# Patient Record
Sex: FEMALE | Race: WHITE | Employment: FULL TIME | URBAN - METROPOLITAN AREA
[De-identification: names, ages, dates, MRNs, and addresses within clinical notes are randomized per-mention and may not be internally consistent; named-entity substitution may affect disease eponyms.]

---

## 2018-05-12 ENCOUNTER — APPOINTMENT (OUTPATIENT)
Dept: MRI IMAGING | Age: 48
End: 2018-05-12
Attending: EMERGENCY MEDICINE
Payer: COMMERCIAL

## 2018-05-12 ENCOUNTER — APPOINTMENT (OUTPATIENT)
Dept: CT IMAGING | Age: 48
End: 2018-05-12
Attending: EMERGENCY MEDICINE
Payer: COMMERCIAL

## 2018-05-12 ENCOUNTER — HOSPITAL ENCOUNTER (OUTPATIENT)
Age: 48
Setting detail: OBSERVATION
Discharge: HOME | End: 2018-05-14
Attending: EMERGENCY MEDICINE | Admitting: INTERNAL MEDICINE
Payer: COMMERCIAL

## 2018-05-12 DIAGNOSIS — R42 VERTIGO: ICD-10-CM

## 2018-05-12 DIAGNOSIS — R11.2 NAUSEA AND VOMITING, INTRACTABILITY OF VOMITING NOT SPECIFIED, UNSPECIFIED VOMITING TYPE: ICD-10-CM

## 2018-05-12 DIAGNOSIS — H55.00 NYSTAGMUS: Primary | ICD-10-CM

## 2018-05-12 LAB
ALBUMIN SERPL-MCNC: 3.7 G/DL (ref 3.5–5)
ALBUMIN/GLOB SERPL: 1.3 {RATIO} (ref 1.1–2.2)
ALP SERPL-CCNC: 77 U/L (ref 45–117)
ALT SERPL-CCNC: 27 U/L (ref 12–78)
ANION GAP SERPL CALC-SCNC: 14 MMOL/L (ref 5–15)
AST SERPL-CCNC: 21 U/L (ref 15–37)
BASOPHILS # BLD: 0.1 K/UL (ref 0–0.1)
BASOPHILS NFR BLD: 1 % (ref 0–1)
BILIRUB SERPL-MCNC: 0.5 MG/DL (ref 0.2–1)
BUN SERPL-MCNC: 16 MG/DL (ref 6–20)
BUN/CREAT SERPL: 16 (ref 12–20)
CALCIUM SERPL-MCNC: 8.5 MG/DL (ref 8.5–10.1)
CHLORIDE SERPL-SCNC: 110 MMOL/L (ref 97–108)
CO2 SERPL-SCNC: 18 MMOL/L (ref 21–32)
CREAT SERPL-MCNC: 1.01 MG/DL (ref 0.55–1.02)
DIFFERENTIAL METHOD BLD: ABNORMAL
EOSINOPHIL # BLD: 0.1 K/UL (ref 0–0.4)
EOSINOPHIL NFR BLD: 1 % (ref 0–7)
ERYTHROCYTE [DISTWIDTH] IN BLOOD BY AUTOMATED COUNT: 13.2 % (ref 11.5–14.5)
GLOBULIN SER CALC-MCNC: 2.9 G/DL (ref 2–4)
GLUCOSE SERPL-MCNC: 136 MG/DL (ref 65–100)
HCG SERPL QL: NEGATIVE
HCT VFR BLD AUTO: 37.8 % (ref 35–47)
HGB BLD-MCNC: 12.7 G/DL (ref 11.5–16)
IMM GRANULOCYTES # BLD: 0.1 K/UL (ref 0–0.04)
IMM GRANULOCYTES NFR BLD AUTO: 1 % (ref 0–0.5)
LIPASE SERPL-CCNC: 102 U/L (ref 73–393)
LYMPHOCYTES # BLD: 2.4 K/UL (ref 0.8–3.5)
LYMPHOCYTES NFR BLD: 20 % (ref 12–49)
MCH RBC QN AUTO: 28.9 PG (ref 26–34)
MCHC RBC AUTO-ENTMCNC: 33.6 G/DL (ref 30–36.5)
MCV RBC AUTO: 86.1 FL (ref 80–99)
MONOCYTES # BLD: 0.6 K/UL (ref 0–1)
MONOCYTES NFR BLD: 5 % (ref 5–13)
NEUTS SEG # BLD: 8.5 K/UL (ref 1.8–8)
NEUTS SEG NFR BLD: 72 % (ref 32–75)
NRBC # BLD: 0 K/UL (ref 0–0.01)
NRBC BLD-RTO: 0 PER 100 WBC
PLATELET # BLD AUTO: 241 K/UL (ref 150–400)
PMV BLD AUTO: 12.1 FL (ref 8.9–12.9)
POTASSIUM SERPL-SCNC: 3.2 MMOL/L (ref 3.5–5.1)
PROT SERPL-MCNC: 6.6 G/DL (ref 6.4–8.2)
RBC # BLD AUTO: 4.39 M/UL (ref 3.8–5.2)
SODIUM SERPL-SCNC: 142 MMOL/L (ref 136–145)
WBC # BLD AUTO: 11.8 K/UL (ref 3.6–11)

## 2018-05-12 PROCEDURE — 83690 ASSAY OF LIPASE: CPT | Performed by: EMERGENCY MEDICINE

## 2018-05-12 PROCEDURE — 85025 COMPLETE CBC W/AUTO DIFF WBC: CPT | Performed by: EMERGENCY MEDICINE

## 2018-05-12 PROCEDURE — 93005 ELECTROCARDIOGRAM TRACING: CPT

## 2018-05-12 PROCEDURE — 96375 TX/PRO/DX INJ NEW DRUG ADDON: CPT

## 2018-05-12 PROCEDURE — A9575 INJ GADOTERATE MEGLUMI 0.1ML: HCPCS | Performed by: EMERGENCY MEDICINE

## 2018-05-12 PROCEDURE — 99285 EMERGENCY DEPT VISIT HI MDM: CPT

## 2018-05-12 PROCEDURE — 74011250636 HC RX REV CODE- 250/636

## 2018-05-12 PROCEDURE — 84703 CHORIONIC GONADOTROPIN ASSAY: CPT | Performed by: EMERGENCY MEDICINE

## 2018-05-12 PROCEDURE — 96361 HYDRATE IV INFUSION ADD-ON: CPT

## 2018-05-12 PROCEDURE — 74011250636 HC RX REV CODE- 250/636: Performed by: EMERGENCY MEDICINE

## 2018-05-12 PROCEDURE — 65660000000 HC RM CCU STEPDOWN

## 2018-05-12 PROCEDURE — 80053 COMPREHEN METABOLIC PANEL: CPT | Performed by: EMERGENCY MEDICINE

## 2018-05-12 PROCEDURE — 99218 HC RM OBSERVATION: CPT

## 2018-05-12 PROCEDURE — 74011250636 HC RX REV CODE- 250/636: Performed by: INTERNAL MEDICINE

## 2018-05-12 PROCEDURE — 96376 TX/PRO/DX INJ SAME DRUG ADON: CPT

## 2018-05-12 PROCEDURE — 70553 MRI BRAIN STEM W/O & W/DYE: CPT

## 2018-05-12 PROCEDURE — 96372 THER/PROPH/DIAG INJ SC/IM: CPT

## 2018-05-12 PROCEDURE — 36415 COLL VENOUS BLD VENIPUNCTURE: CPT | Performed by: EMERGENCY MEDICINE

## 2018-05-12 PROCEDURE — 70450 CT HEAD/BRAIN W/O DYE: CPT

## 2018-05-12 PROCEDURE — 96374 THER/PROPH/DIAG INJ IV PUSH: CPT

## 2018-05-12 RX ORDER — ENOXAPARIN SODIUM 100 MG/ML
40 INJECTION SUBCUTANEOUS EVERY 24 HOURS
Status: DISCONTINUED | OUTPATIENT
Start: 2018-05-12 | End: 2018-05-14 | Stop reason: HOSPADM

## 2018-05-12 RX ORDER — SODIUM CHLORIDE 0.9 % (FLUSH) 0.9 %
10 SYRINGE (ML) INJECTION
Status: ACTIVE | OUTPATIENT
Start: 2018-05-12 | End: 2018-05-13

## 2018-05-12 RX ORDER — MECLIZINE HCL 12.5 MG 12.5 MG/1
25 TABLET ORAL EVERY 6 HOURS
Status: DISCONTINUED | OUTPATIENT
Start: 2018-05-12 | End: 2018-05-14 | Stop reason: HOSPADM

## 2018-05-12 RX ORDER — SODIUM CHLORIDE 9 MG/ML
75 INJECTION, SOLUTION INTRAVENOUS CONTINUOUS
Status: DISCONTINUED | OUTPATIENT
Start: 2018-05-12 | End: 2018-05-13

## 2018-05-12 RX ORDER — CETIRIZINE HCL 10 MG
10 TABLET ORAL DAILY
Status: DISCONTINUED | OUTPATIENT
Start: 2018-05-13 | End: 2018-05-14 | Stop reason: HOSPADM

## 2018-05-12 RX ORDER — THERA TABS 400 MCG
1 TAB ORAL DAILY
COMMUNITY

## 2018-05-12 RX ORDER — THERA TABS 400 MCG
1 TAB ORAL DAILY
Status: DISCONTINUED | OUTPATIENT
Start: 2018-05-13 | End: 2018-05-14 | Stop reason: HOSPADM

## 2018-05-12 RX ORDER — ONDANSETRON 2 MG/ML
INJECTION INTRAMUSCULAR; INTRAVENOUS
Status: COMPLETED
Start: 2018-05-12 | End: 2018-05-12

## 2018-05-12 RX ORDER — ONDANSETRON 2 MG/ML
4 INJECTION INTRAMUSCULAR; INTRAVENOUS
Status: DISCONTINUED | OUTPATIENT
Start: 2018-05-12 | End: 2018-05-14 | Stop reason: HOSPADM

## 2018-05-12 RX ORDER — LANOLIN ALCOHOL/MO/W.PET/CERES
500 CREAM (GRAM) TOPICAL DAILY
Status: DISCONTINUED | OUTPATIENT
Start: 2018-05-13 | End: 2018-05-14 | Stop reason: HOSPADM

## 2018-05-12 RX ORDER — SODIUM CHLORIDE 0.9 % (FLUSH) 0.9 %
5-10 SYRINGE (ML) INJECTION AS NEEDED
Status: DISCONTINUED | OUTPATIENT
Start: 2018-05-12 | End: 2018-05-14 | Stop reason: HOSPADM

## 2018-05-12 RX ORDER — DEXTROAMPHETAMINE SACCHARATE, AMPHETAMINE ASPARTATE, DEXTROAMPHETAMINE SULFATE AND AMPHETAMINE SULFATE 2.5; 2.5; 2.5; 2.5 MG/1; MG/1; MG/1; MG/1
20 TABLET ORAL
Status: DISCONTINUED | OUTPATIENT
Start: 2018-05-12 | End: 2018-05-14 | Stop reason: HOSPADM

## 2018-05-12 RX ORDER — METOCLOPRAMIDE HYDROCHLORIDE 5 MG/ML
10 INJECTION INTRAMUSCULAR; INTRAVENOUS
Status: COMPLETED | OUTPATIENT
Start: 2018-05-12 | End: 2018-05-12

## 2018-05-12 RX ORDER — SODIUM CHLORIDE 0.9 % (FLUSH) 0.9 %
5-10 SYRINGE (ML) INJECTION EVERY 8 HOURS
Status: DISCONTINUED | OUTPATIENT
Start: 2018-05-12 | End: 2018-05-14 | Stop reason: HOSPADM

## 2018-05-12 RX ORDER — METOCLOPRAMIDE HYDROCHLORIDE 5 MG/ML
10 INJECTION INTRAMUSCULAR; INTRAVENOUS
Status: DISCONTINUED | OUTPATIENT
Start: 2018-05-12 | End: 2018-05-12 | Stop reason: SDUPTHER

## 2018-05-12 RX ORDER — CETIRIZINE HCL 10 MG
TABLET ORAL
COMMUNITY

## 2018-05-12 RX ORDER — ONDANSETRON 2 MG/ML
4 INJECTION INTRAMUSCULAR; INTRAVENOUS
Status: COMPLETED | OUTPATIENT
Start: 2018-05-12 | End: 2018-05-12

## 2018-05-12 RX ORDER — GADOTERATE MEGLUMINE 376.9 MG/ML
17 INJECTION INTRAVENOUS
Status: COMPLETED | OUTPATIENT
Start: 2018-05-12 | End: 2018-05-12

## 2018-05-12 RX ADMIN — SODIUM CHLORIDE 1000 ML: 900 INJECTION, SOLUTION INTRAVENOUS at 15:57

## 2018-05-12 RX ADMIN — METOCLOPRAMIDE 10 MG: 5 INJECTION, SOLUTION INTRAMUSCULAR; INTRAVENOUS at 17:32

## 2018-05-12 RX ADMIN — ONDANSETRON 4 MG: 2 INJECTION INTRAMUSCULAR; INTRAVENOUS at 15:46

## 2018-05-12 RX ADMIN — SODIUM CHLORIDE 75 ML/HR: 900 INJECTION, SOLUTION INTRAVENOUS at 21:54

## 2018-05-12 RX ADMIN — ENOXAPARIN SODIUM 40 MG: 40 INJECTION SUBCUTANEOUS at 22:02

## 2018-05-12 RX ADMIN — MECLIZINE 25 MG: 12.5 TABLET ORAL at 22:03

## 2018-05-12 RX ADMIN — Medication 10 ML: at 21:55

## 2018-05-12 RX ADMIN — GADOTERATE MEGLUMINE 17 ML: 376.9 INJECTION INTRAVENOUS at 20:08

## 2018-05-12 RX ADMIN — ONDANSETRON 4 MG: 2 INJECTION INTRAMUSCULAR; INTRAVENOUS at 22:15

## 2018-05-12 NOTE — PROGRESS NOTES
Admission Medication Reconciliation:    Information obtained from: Patient, RX Query    Significant PMH/Disease States:   Past Medical History:   Diagnosis Date    Asthma     Hypersomnia     TAKES ADDERAL FOR BOUTS OF SUDDEN EXTREME EXHAUSTION    Migraine     Unspecified adverse effect of anesthesia     HYPERSOMNIA    UTI (urinary tract infection) 9/23/13    MULTIPLE IN PAST       Chief Complaint for this Admission:  Vomiting    Allergies:  Latex; Sulfa (sulfonamide antibiotics); Codeine; Other medication; Shellfish derived; and Zantac [ranitidine hcl]    Prior to Admission Medications:   Prior to Admission Medications   Prescriptions Last Dose Informant Patient Reported? Taking? CYANOCOBALAMIN, VITAMIN B-12, (VITAMIN B-12 PO) 5/12/2018 at Unknown time  Yes Yes   Sig: Take  by mouth daily. cetirizine (ZYRTEC) 10 mg tablet 5/12/2018 at Unknown time  Yes Yes   Sig: Take  by mouth daily as needed for Allergies. dextroamphetamine-amphetamine (ADDERALL) 10 mg tablet 5/12/2018 at Unknown time  Yes Yes   Sig: Take 20 mg by mouth daily as neededIndications: IDIOPATHIC HYPERSOMNOLENCE.           ondansetron (ZOFRAN ODT) 4 mg disintegrating tablet 5/12/2018 at Unknown time  No Yes   Sig: Take 1 Tab by mouth every eight (8) hours as needed for Nausea. therapeutic multivitamin (THERAGRAN) tablet 5/12/2018 at Unknown time  Yes Yes   Sig: Take 1 Tab by mouth daily. Facility-Administered Medications: None         Comments/Recommendations: Patient provided information, RX Query as well. Allergies were reviewed. Please note:  1. Heart rate variable from 40s-70s, appeared sinus on monitor, alerted RN immediately (especially given patient presenting symptoms). Providers are aware. Has received Zofran, Reglan and fluid bolus per RN. 2. Generic Adderall: takes for idiopathic hypersomnolence, is currently taking 20 mg (confirmed with her). Added:  1. Cetirizine  2. MVT    Deleted:  1.  CAM (she has stopped using these)    Thank you for allowing me to participate in the care of your patient.     Denae Ward PharmD, RN #[id number]

## 2018-05-12 NOTE — IP AVS SNAPSHOT
Summary of Care Report The Summary of Care report has been created to help improve care coordination. Users with access to [employer] or [address] (Web-based application) may access additional patient information including the Discharge Summary. If you are not currently a [address] user and need more information, please call the number listed below in the [place] [address] section and ask to be connected with Medical Records. Facility Information Name Address Phone [address] [facility] [phone] [phone] Patient Information Patient Name Sex TONY Burden ([id number]) Female 1970 Discharge Information Admitting Provider Service Area Unit Fawn Lyons MD / [place] [address] [place] [address] / [phone] Discharge Provider Discharge Date/Time Discharge Disposition Destination (none) 2018 Morning (Pending) AHR (none) Patient Language Language ENGLISH [13] Hospital Problems as of 2018  Reviewed: 2018  8:53 PM by Fawn Lyons MD  
  
  
  
 Class Noted - Resolved Last Modified POA Active Problems * (Principal)Vertigo  2018 - Present 2018 by Obed Vasques MD Unknown Entered by Fawn Lyons MD  
  Nystagmus  2018 - Present 2018 by Fawn Lyons MD Unknown Entered by Fawn Lyons MD  
  
Non-Hospital Problems as of 2018  Reviewed: 2018  8:53 PM by Fawn Lyons MD  
  
  
  
 Class Noted - Resolved Last Modified Active Problems Abnormal vaginal bleeding  2013 - Present 2013 Entered by Milton Gomez Female pelvic pain (Chronic)  2013 - Present 2013 Entered by Milton Gomez Submucous uterine fibroid  2013 - Present 2013 Entered by Milton Gomez Congenital abnormal shape of uterus  9/27/2013 - Present 9/27/2013 Entered by Waldemar Call You are allergic to the following Allergen Reactions Latex Hives HAS REACTED TO BANDAIDS, GLOVES, CONDOMS Sulfa (Sulfonamide Antibiotics) Rash \"AN ALLERGIC REACTION WITH MY BLOOD; HAD I NOT BEEN ON AN ANTIHISTAMINE I'D BE DEAD, MY DOCTOR SAID\". Codeine Other (comments) FELT LIKE HEAD WAS \"FULL OF COTTON\" Other Medication Hives ENVIRONMENTAL ALLERGIES: DUST-HIVES Shellfish Derived Nausea and Vomiting SCALLOPS ONLY CAUSE N&V, UNCONTROLLABLY Zantac (Ranitidine Hcl) Hives Current Discharge Medication List  
  
START taking these medications Dose & Instructions Dispensing Information Comments  
 meclizine 25 mg tablet Commonly known as:  ANTIVERT Dose:  25 mg Take 1 Tab by mouth three (3) times daily as needed for up to 10 days. Quantity:  20 Tab Refills:  0 CONTINUE these medications which have NOT CHANGED Dose & Instructions Dispensing Information Comments ADDERALL 10 mg tablet Generic drug:  dextroamphetamine-amphetamine Dose:  20 mg Take 20 mg by mouth daily as neededIndications: IDIOPATHIC HYPERSOMNOLENCE. Refills:  0  
   
 cetirizine 10 mg tablet Commonly known as:  ZYRTEC Take  by mouth daily as needed for Allergies. Refills:  0  
   
 ondansetron 4 mg disintegrating tablet Commonly known as:  ZOFRAN ODT Dose:  4 mg Take 1 Tab by mouth every eight (8) hours as needed for Nausea. Quantity:  20 Tab Refills:  1 therapeutic multivitamin tablet Commonly known as:  [facility] Dose:  1 Tab Take 1 Tab by mouth daily. Refills:  0  
   
 VITAMIN B-12 PO Take  by mouth daily. Refills:  0 Follow-up Information Follow up With Details Comments Contact Info See your ENT doctor as sson as you can after discharge Mina Wetzel NP Go on 5/16/2018 Hospital PCP f/u appointment on Wednesday 5/16 @ 11:00 a.m. [address] [place] [address] 
[phone] Discharge Instructions   
  
   
Benign Paroxysmal Positional Vertigo (BPPV): Care Instructions Your Care Instructions Benign paroxysmal positional vertigo, also called BPPV, is an inner ear problem. It causes a spinning or whirling sensation when you move your head. This sensation is called vertigo. The vertigo usually lasts for less than a minute. People often have vertigo spells for a few days or weeks. Then the vertigo goes away. But it may come back again. The vertigo may be mild, or it may be bad enough to cause unsteadiness, nausea, and vomiting. When you move, your inner ear sends messages to the brain. This helps you keep your balance. Vertigo can happen when debris builds up in the inner ear. The buildup can cause the inner ear to send the wrong message to the brain. Your doctor may move you in different positions to help your vertigo get better faster. This is called the Epley maneuver. Your doctor may also prescribe medicines or exercises to help with your symptoms. Follow-up care is a key part of your treatment and safety. Be sure to make and go to all appointments, and call your doctor if you are having problems. It's also a good idea to know your test results and keep a list of the medicines you take. How can you care for yourself at home? · If your doctor suggests that you do Stover-Daroff exercises: ¨ Sit on the edge of a bed or sofa. Quickly lie down on the side that causes the worst vertigo. Lie on your side with your ear down. ¨ Stay in this position for at least 30 seconds or until the vertigo goes away. ¨ Sit up. If this causes vertigo, wait for it to stop. ¨ Repeat the procedure on the other side. ¨ Repeat this 10 times. Do these exercises 2 times a day until the vertigo is gone. When should you call for help? Call 911 anytime you think you may need emergency care. For example, call if: 
? · You have symptoms of a stroke. These may include: 
¨ Sudden numbness, tingling, weakness, or loss of movement in your face, arm, or leg, especially on only one side of your body. ¨ Sudden vision changes. ¨ Sudden trouble speaking. ¨ Sudden confusion or trouble understanding simple statements. ¨ Sudden problems with walking or balance. ¨ A sudden, severe headache that is different from past headaches. ?Call your doctor now or seek immediate medical care if: 
? · You have new or worse nausea and vomiting. ? · You have new symptoms such as hearing loss or roaring in your ears. ? Watch closely for changes in your health, and be sure to contact your doctor if: 
? · You are not getting better as expected. ? · Your vertigo gets worse. Where can you learn more? Go to http://urvashi-kristi.info/. Enter  in the search box to learn more about \"Benign Paroxysmal Positional Vertigo (BPPV): Care Instructions. \" Current as of: May 12, 2017 Content Version: 11.4 © 2538-5604 [employer]. Care instructions adapted under license by [employer] (which disclaims liability or warranty for this information). If you have questions about a medical condition or this instruction, always ask your healthcare professional. [place] [address] any warranty or liability for your use of this information. 
  
 
 Discharge Instructions PATIENT ID: Belia Ramos MRN: [id number] YOB: 1970 DATE OF ADMISSION: 5/12/2018  3:39 PM   
DATE OF DISCHARGE: 5/14/2018 PRIMARY CARE PROVIDER: Adam Diaz MD  
 
ATTENDING PHYSICIAN: Marlyn Brown MD 
DISCHARGING PROVIDER: Marlyn Brown MD   
To contact this individual call [phone] and ask the  to page. If unavailable ask to be transferred the Adult Hospitalist Department. DISCHARGE DIAGNOSES and ADDITIONAL CARE RECOMMENDATIONS: 
Vertigo(Benign Paroxysmal Positional Vertigo (BPPV)) with right nystagmus. Head cat scan and brain MRI are both negative for any acute intracranial processes. You may use the meclizine as needed. You check with your ENT for possible office based treatment . You can also do some home based maneuvers yourself,read the directions you are provided with this discharge paper. CONSULTATIONS: IP CONSULT TO NEUROLOGY PROCEDURES/SURGERIES: * No surgery found * PENDING TEST RESULTS At the time of discharge the following test results are still pending: none FOLLOW UP APPOINTMENTS:  
Follow-up Information Follow up With Details Comments Contact Info See your ENT doctor as sson as you can after discharge DIET: Regular Diet ACTIVITY: Activity as tolerated DISCHARGE MEDICATIONS: 
 See Medication Reconciliation Form · It is important that you take the medication exactly as they are prescribed. · Keep your medication in the bottles provided by the pharmacist and keep a list of the medication names, dosages, and times to be taken in your wallet. · Do not take other medications without consulting your doctor. NOTIFY YOUR PHYSICIAN FOR ANY OF THE FOLLOWING:  
Fever over 101 degrees for 24 hours. Chest pain, shortness of breath, fever, chills, nausea, vomiting, diarrhea, change in mentation, falling, weakness, bleeding. Severe pain or pain not relieved by medications. Or, any other signs or symptoms that you may have questions about. DISPOSITION: 
x  Home With: 
 OT  PT  [place]  RN  
  
 SNF/Inpatient Rehab/[facility] Independent/assisted living Hospice Other:  
 
 
Signed: Bethany Solares MD 
5/14/2018 8:15 AM 
 
 
Chart Review Routing History Recipient Method Report Sent By Solomon Castro MD  
Fax: [phone] Phone: [phone] Fax Krystyna Chavis MD NOTES AUTO ROUTING REPORT [address] [place] [[id number]] 5/14/2018  8:29 AM 05/14/2018

## 2018-05-12 NOTE — IP AVS SNAPSHOT
2700 [facility] [address] [place] [address] 
[phone] Patient: Angel Brown MRN: [id number] :1970 About your hospitalization You were admitted on:  May 12, 2018 You last received care in the:  [id number] Nay[facility] Callahan You were discharged on:  May 14, 2018 Why you were hospitalized Your primary diagnosis was:  Vertigo Your diagnoses also included:  Nystagmus Follow-up Information Follow up With Details Comments Contact Info See your ENT doctor as sson as you can after discharge Alex Em NP Go on 2018 Hospital PCP f/u appointment on  @ 11:00 a.m. 5500 [facility] 
350 [facility] 
[phone] Discharge Orders None A check rocio indicates which time of day the medication should be taken. My Medications START taking these medications Instructions Each Dose to Equal  
 Morning Noon Evening Bedtime  
 meclizine 25 mg tablet Commonly known as:  ANTIVERT Your last dose was: Your next dose is: Take 1 Tab by mouth three (3) times daily as needed for up to 10 days. 25 mg CONTINUE taking these medications Instructions Each Dose to Equal  
 Morning Noon Evening Bedtime ADDERALL 10 mg tablet Generic drug:  dextroamphetamine-amphetamine Your last dose was: Your next dose is: Take 20 mg by mouth daily as neededIndications: IDIOPATHIC HYPERSOMNOLENCE.  
 20 mg  
    
   
   
   
  
 cetirizine 10 mg tablet Commonly known as:  ZYRTEC Your last dose was: Your next dose is: Take  by mouth daily as needed for Allergies. ondansetron 4 mg disintegrating tablet Commonly known as:  ZOFRAN ODT Your last dose was: Your next dose is: Take 1 Tab by mouth every eight (8) hours as needed for Nausea. 4 mg  
    
   
   
   
  
 therapeutic multivitamin tablet Commonly known as:  [facility] Your last dose was: Your next dose is: Take 1 Tab by mouth daily. 1 Tab VITAMIN B-12 PO Your last dose was: Your next dose is: Take  by mouth daily. Where to Get Your Medications Information on where to get these meds will be given to you by the nurse or doctor. ! Ask your nurse or doctor about these medications  
  meclizine 25 mg tablet Discharge Instructions   
  
   
Benign Paroxysmal Positional Vertigo (BPPV): Care Instructions Your Care Instructions Benign paroxysmal positional vertigo, also called BPPV, is an inner ear problem. It causes a spinning or whirling sensation when you move your head. This sensation is called vertigo. The vertigo usually lasts for less than a minute. People often have vertigo spells for a few days or weeks. Then the vertigo goes away. But it may come back again. The vertigo may be mild, or it may be bad enough to cause unsteadiness, nausea, and vomiting. When you move, your inner ear sends messages to the brain. This helps you keep your balance. Vertigo can happen when debris builds up in the inner ear. The buildup can cause the inner ear to send the wrong message to the brain. Your doctor may move you in different positions to help your vertigo get better faster. This is called the Epley maneuver. Your doctor may also prescribe medicines or exercises to help with your symptoms. Follow-up care is a key part of your treatment and safety. Be sure to make and go to all appointments, and call your doctor if you are having problems. It's also a good idea to know your test results and keep a list of the medicines you take. How can you care for yourself at home? · If your doctor suggests that you do Stover-Daroff exercises: ¨ Sit on the edge of a bed or sofa. Quickly lie down on the side that causes the worst vertigo. Lie on your side with your ear down. ¨ Stay in this position for at least 30 seconds or until the vertigo goes away. ¨ Sit up. If this causes vertigo, wait for it to stop. ¨ Repeat the procedure on the other side. ¨ Repeat this 10 times. Do these exercises 2 times a day until the vertigo is gone. When should you call for help? Call 911 anytime you think you may need emergency care. For example, call if: 
? · You have symptoms of a stroke. These may include: 
¨ Sudden numbness, tingling, weakness, or loss of movement in your face, arm, or leg, especially on only one side of your body. ¨ Sudden vision changes. ¨ Sudden trouble speaking. ¨ Sudden confusion or trouble understanding simple statements. ¨ Sudden problems with walking or balance. ¨ A sudden, severe headache that is different from past headaches. ?Call your doctor now or seek immediate medical care if: 
? · You have new or worse nausea and vomiting. ? · You have new symptoms such as hearing loss or roaring in your ears. ? Watch closely for changes in your health, and be sure to contact your doctor if: 
? · You are not getting better as expected. ? · Your vertigo gets worse. Where can you learn more? Go to http://urvashi-kristi.info/. Enter  in the search box to learn more about \"Benign Paroxysmal Positional Vertigo (BPPV): Care Instructions. \" Current as of: May 12, 2017 Content Version: 11.4 © 5100-0734 [employer]. Care instructions adapted under license by [employer] (which disclaims liability or warranty for this information). If you have questions about a medical condition or this instruction, always ask your healthcare professional. [place] [address] any warranty or liability for your use of this information. 
  
 
 Discharge Instructions PATIENT ID: Jackqulyn Riedel MRN: [id number] YOB: 1970 DATE OF ADMISSION: 5/12/2018  3:39 PM   
DATE OF DISCHARGE: 5/14/2018 PRIMARY CARE PROVIDER: Balbir Penaloza MD  
 
ATTENDING PHYSICIAN: Koby Goff MD 
DISCHARGING PROVIDER: Koby Goff MD   
To contact this individual call [phone] and ask the  to page. If unavailable ask to be transferred the Adult Hospitalist Department. DISCHARGE DIAGNOSES and ADDITIONAL CARE RECOMMENDATIONS: 
Vertigo(Benign Paroxysmal Positional Vertigo (BPPV)) with right nystagmus. Head cat scan and brain MRI are both negative for any acute intracranial processes. You may use the meclizine as needed. You check with your ENT for possible office based treatment . You can also do some home based maneuvers yourself,read the directions you are provided with this discharge paper. CONSULTATIONS: IP CONSULT TO NEUROLOGY PROCEDURES/SURGERIES: * No surgery found * PENDING TEST RESULTS At the time of discharge the following test results are still pending: none FOLLOW UP APPOINTMENTS:  
Follow-up Information Follow up With Details Comments Contact Info See your ENT doctor as sson as you can after discharge DIET: Regular Diet ACTIVITY: Activity as tolerated DISCHARGE MEDICATIONS: 
 See Medication Reconciliation Form · It is important that you take the medication exactly as they are prescribed. · Keep your medication in the bottles provided by the pharmacist and keep a list of the medication names, dosages, and times to be taken in your wallet. · Do not take other medications without consulting your doctor. NOTIFY YOUR PHYSICIAN FOR ANY OF THE FOLLOWING:  
Fever over 101 degrees for 24 hours. Chest pain, shortness of breath, fever, chills, nausea, vomiting, diarrhea, change in mentation, falling, weakness, bleeding. Severe pain or pain not relieved by medications. Or, any other signs or symptoms that you may have questions about. DISPOSITION: 
x  Home With: 
 OT  PT  [facility]  RN  
  
 SNF/Inpatient Rehab/LTAC Independent/assisted living Hospice Other:  
 
 
Signed: Cyndi Bautista MD 
5/14/2018 8:15 AM 
 
 
  
  
  
Cotendo Announcement We are excited to announce that we are making your provider's discharge notes available to you in Cotendo. You will see these notes when they are completed and signed by the physician that discharged you from your recent hospital stay. If you have any questions or concerns about any information you see in Cotendo, please call the Health Information Department where you were seen or reach out to your Primary Care Provider for more information about your plan of care. Introducing \[facility]\"" & HEALTH SERVICES! [facility] introduces Cotendo patient portal. Now you can access parts of your medical record, email your doctor's office, and request medication refills online. 1. In your internet browser, go to [email or web address] 2. Click on the First Time User? Click Here link in the Sign In box. You will see the New Member Sign Up page. 3. Enter your Cotendo Access Code exactly as it appears below. You will not need to use this code after youve completed the sign-up process. If you do not sign up before the expiration date, you must request a new code. · Cotendo Access Code: [email or web address] Expires: 8/10/2018  3:42 PM 
 
4. Enter the last four digits of your Social Security Number (xxxx) and Date of Birth (mm/dd/yyyy) as indicated and click Submit. You will be taken to the next sign-up page. 5. Create a Cotendo ID. This will be your Cotendo login ID and cannot be changed, so think of one that is secure and easy to remember. 6. Create a Cotendo password. You can change your password at any time. 7. Enter your Password Reset Question and Answer.  This can be used at a later time if you forget your password. 8. Enter your e-mail address. You will receive e-mail notification when new information is available in [address] 9. Click Sign Up. You can now view and download portions of your medical record. 10. Click the Download Summary menu link to download a portable copy of your medical information. If you have questions, please visit the Frequently Asked Questions section of the [employer] website. Remember, Locaweb is NOT to be used for urgent needs. For medical emergencies, dial 911. Now available from your iPhone and Android! Introducing Esau Rodriguez As a [employer] patient, I wanted to make you aware of our electronic visit tool called Esau Rodriguez. [employer] 24/7 allows you to connect within minutes with a medical provider 24 hours a day, seven days a week via a mobile device or tablet or logging into a secure website from your computer. You can access Esau Rodriguez from anywhere in the [place]. A virtual visit might be right for you when you have a simple condition and feel like you just dont want to get out of bed, or cant get away from work for an appointment, when your regular [employer] provider is not available (evenings, weekends or holidays), or when youre out of town and need minor care. Electronic visits cost only $49 and if the [employer] 24/7 provider determines a prescription is needed to treat your condition, one can be electronically transmitted to a nearby pharmacy*. Please take a moment to enroll today if you have not already done so. The enrollment process is free and takes just a few minutes. To enroll, please download the [employer] 24/7 juan to your tablet or phone, or visit [email or web address] to enroll on your computer.    
And, as an [address] [place] [address] patient with a [employer] account, the results of your visits will be scanned into your electronic medical record and your primary care provider will be able to view the scanned results. We urge you to continue to see your regular [facility] provider for your ongoing medical care. And while your primary care provider may not be the one available when you seek a Esau Hernandezfin virtual visit, the peace of mind you get from getting a real diagnosis real time can be priceless. For more information on [facility], view our Frequently Asked Questions (FAQs) at [email or web address]. Sincerely, 
 
Samantha Razo MD 
Chief Medical Officer [facility] *:  certain medications cannot be prescribed via [facility] Unresulted Labs-Please follow up with your PCP about these lab tests Order Current Status EKG, 12 LEAD, INITIAL Preliminary result Providers Seen During Your Hospitalization Provider Specialty Primary office phone April Chapman MD Emergency Medicine [phone] Opal Garza MD Internal Medicine [phone] Estephania Oliveros MD Internal Medicine [phone] Your Primary Care Physician (PCP) Primary Care Physician Office Phone Office Fax Carolyn Paez [phone] You are allergic to the following Allergen Reactions Latex Hives HAS REACTED TO BANDAIDS, GLOVES, CONDOMS Sulfa (Sulfonamide Antibiotics) Rash \"AN ALLERGIC REACTION WITH MY BLOOD; HAD I NOT BEEN ON AN ANTIHISTAMINE I'D BE DEAD, MY DOCTOR SAID\". Codeine Other (comments) FELT LIKE HEAD WAS \"FULL OF COTTON\" Other Medication Hives ENVIRONMENTAL ALLERGIES: DUST-HIVES Shellfish Derived Nausea and Vomiting SCALLOPS ONLY CAUSE N&V, UNCONTROLLABLY Zantac (Ranitidine Hcl) Hives Recent Documentation Height Weight Breastfeeding? BMI OB Status Smoking Status 1.676 m 84.8 kg No 30.18 kg/m2 IUD Former Smoker Emergency Contacts Name Discharge Info Relation Home Work Mobile Melba Barker DISCHARGE CAREGIVER [3] Parent [1] [id number] Patient Belongings The following personal items are in your possession at time of discharge: 
  Dental Appliances: None  Visual Aid: Contacts      Home Medications: None   Jewelry: None  Clothing: Pants, Shirt, Other (comment) (shoes and bra and top)    Other Valuables: Cell Phone  Personal Items Sent to Safe: none Please provide this summary of care documentation to your next provider. Signatures-by signing, you are acknowledging that this After Visit Summary has been reviewed with you and you have received a copy. Patient Signature:  ____________________________________________________________ Date:  ____________________________________________________________  
  
[facility] Provider Signature:  ____________________________________________________________ Date:  ____________________________________________________________

## 2018-05-12 NOTE — ED PROVIDER NOTES
HPI Comments: 41 y.o. female with past medical history significant for hypersomnia, migraine, asthma and UTI who presents from [facility] via EMS with chief complaint of vomiting. Per significant other, he and the pt were walking around the [facility] this evening when the pt experienced sudden onset of dizziness. The pt reports that her dizziness felt like a lightheaded sensation during onset, however is now more of a room spinning sensation. Per pt, her dizziness was present with moderate diaphoresis. Shortly after onset of her dizziness, the pt reports that she began to experience N/V, without evidence of blood. Per pt, she has remained with the dizzy sensation as well as episodes of N/V. She states that she is unsure if her symptoms are related to being in a very hot environment while in [place] today. Pt notes that she has no hx of similar dizziness in the past.  She makes it known that she experienced numbness over her bilateral upper and lower extremities during onset of her dizziness. No similar numbness is present at this time per pt. She denies current chances of pregnancy. Pt further denies fever, chills, diarrhea, CP, SOB, abd pain, headache, visual changes and urinary symptoms. There are no other acute medical concerns at this time. Social hx: Former smoker, Occasional ETOH consumption     PCP: Mina Lantigua MD    Note written by Olya Mak, as dictated by Keith Michele MD 4:10 PM          The history is provided by the patient and a significant other. No  was used.         Past Medical History:   Diagnosis Date    Asthma     Hypersomnia     TAKES ADDERAL FOR BOUTS OF SUDDEN EXTREME EXHAUSTION    Migraine     Unspecified adverse effect of anesthesia     HYPERSOMNIA    UTI (urinary tract infection) 9/23/13    MULTIPLE IN PAST       Past Surgical History:   Procedure Laterality Date    HX ENDOSCOPY  2008   [facility]  2013    hysteroscopy    HX HEENT  2011 AUG. SEPTOPLASTY, SINUS CAUTERIZATION    HX ORTHOPAEDIC  12/2012    ORIF L THUMB/BONE GRAFT    HX WISDOM TEETH EXTRACTION  AGE 20         Family History:   Problem Relation Age of Onset    Hypertension Mother    [address] [facility] Other Father      LOW BP       Social History     Social History    Marital status: SINGLE     Spouse name: N/A    Number of children: N/A    Years of education: N/A     Occupational History    Not on file. Social History Main Topics    Smoking status: Former Smoker     Packs/day: 1.00     Years: 12.00     Quit date: 10/23/2000    Smokeless tobacco: Not on file    Alcohol use Yes      Comment: OCCASIONAL    Drug use: No    Sexual activity: Yes     Birth control/ protection: IUD     Other Topics Concern    Not on file     Social History Narrative         ALLERGIES: Latex; Sulfa (sulfonamide antibiotics); Codeine; Other medication; Shellfish derived; and Zantac [ranitidine hcl]    Review of Systems   Constitutional: Positive for diaphoresis. Negative for chills and fever. HENT: Negative for congestion, postnasal drip, rhinorrhea and sore throat. Eyes: Negative for photophobia, discharge, redness and visual disturbance. Respiratory: Negative for cough, chest tightness, shortness of breath and wheezing. Cardiovascular: Negative for chest pain, palpitations and leg swelling. Gastrointestinal: Positive for nausea and vomiting. Negative for abdominal distention, abdominal pain, blood in stool, constipation and diarrhea. Genitourinary: Negative for difficulty urinating, dysuria, frequency, hematuria and urgency. Musculoskeletal: Negative for arthralgias, back pain, joint swelling and myalgias. Skin: Negative for color change and rash. Neurological: Positive for dizziness and light-headedness. Negative for speech difficulty, weakness, numbness and headaches. Psychiatric/Behavioral: Negative for confusion. The patient is not nervous/anxious.     All other systems reviewed and are negative. Vitals:    05/12/18 1545   BP: 118/45   Pulse: (!) 59   Resp: 18   Temp: 97 °F (36.1 °C)   SpO2: 100%   Weight: 84.8 kg (187 lb)   Height: 5' 6\" (1.676 m)            Physical Exam   Constitutional: She is oriented to person, place, and time. She appears well-developed and well-nourished. No distress. HENT:   Head: Normocephalic and atraumatic. Right Ear: External ear normal.   Left Ear: External ear normal.   Nose: Nose normal.   Mouth/Throat: Oropharynx is clear and moist.   Eyes: Conjunctivae are normal. Pupils are equal, round, and reactive to light. No scleral icterus. Significant nystagmus to the left. Neck: Normal range of motion. Neck supple. No JVD present. No tracheal deviation present. No thyromegaly present. Cardiovascular: Normal rate, regular rhythm and normal heart sounds. Exam reveals no gallop and no friction rub. No murmur heard. Pulmonary/Chest: Effort normal and breath sounds normal. No respiratory distress. She has no wheezes. She has no rales. She exhibits no tenderness. Abdominal: Soft. Bowel sounds are normal. She exhibits no distension and no mass. There is no tenderness. There is no rebound and no guarding. Musculoskeletal: Normal range of motion. She exhibits no edema or tenderness. Lymphadenopathy:     She has no cervical adenopathy. Neurological: She is alert and oriented to person, place, and time. She has normal strength. She displays no atrophy and no tremor. No cranial nerve deficit. She exhibits normal muscle tone. Coordination and gait normal.   Good motor strength to all extremities. Skin: Skin is warm and dry. No rash noted. She is not diaphoretic. No erythema. Psychiatric: She has a normal mood and affect. Her behavior is normal. Judgment and thought content normal.   Nursing note and vitals reviewed.      Note written by Olya Corral, as dictated by Lindsay Ortiz MD 4:10 PM    MDM  Number of Diagnoses or Management Options  Diagnosis management comments: SABRINA  Impression: 44-year-old female presenting to the emergency department with acute onset of an episode of initial lightheadedness which progressed to intense vertigo associated with diaphoresis nausea vomiting. No headache, no focal neurologic signs or symptoms either by history or by examination other than the patient has nystagmus to the left. Differential includes dehydration, electrolyte abnormalities, onset of vertigo, to consider brainstem lesion/CVA, doubt rhabdomyolysis. Plan of care will be baseline labs, we'll treat symptomatically, CT of the head and will continue treat accordingly. In addition the patient will receive IV fluids and anti-medic medication. ED Course     ED EKG interpretation:  Rhythm: sinus bradycardia; Rate (approx.): 44 bpm; Axis: normal; ST/T wave: normal    Note written by Olya Rivas, as dictated by Vani Booker MD 4:00 PM        PROGRESS NOTE:  5:16 PM    CT imaging appears negative at this time. PROGRESS NOTE:  5:25 PM    Pt still with N/V as well as nystagmus at this time. CONSULT NOTE:  5:28 PM Vani Booker MD spoke with Dr. Ish Varma, Consult for Hospitalist.  Discussed available diagnostic tests and clinical findings. Dr. Ish Varma will see and admit the pt.      Procedures

## 2018-05-12 NOTE — ED TRIAGE NOTES
Pt presents with complaints of sudden onset of dizziness, nausea, and [place]. Pt reports dizziness worsens with head movement.  Pt diaphoretic

## 2018-05-13 LAB
ANION GAP SERPL CALC-SCNC: 9 MMOL/L (ref 5–15)
BASOPHILS # BLD: 0 K/UL (ref 0–0.1)
BASOPHILS NFR BLD: 0 % (ref 0–1)
BUN SERPL-MCNC: 9 MG/DL (ref 6–20)
BUN/CREAT SERPL: 16 (ref 12–20)
CALCIUM SERPL-MCNC: 7.9 MG/DL (ref 8.5–10.1)
CHLORIDE SERPL-SCNC: 112 MMOL/L (ref 97–108)
CO2 SERPL-SCNC: 20 MMOL/L (ref 21–32)
CREAT SERPL-MCNC: 0.58 MG/DL (ref 0.55–1.02)
DIFFERENTIAL METHOD BLD: ABNORMAL
EOSINOPHIL # BLD: 0 K/UL (ref 0–0.4)
EOSINOPHIL NFR BLD: 0 % (ref 0–7)
ERYTHROCYTE [DISTWIDTH] IN BLOOD BY AUTOMATED COUNT: 13.3 % (ref 11.5–14.5)
GLUCOSE SERPL-MCNC: 96 MG/DL (ref 65–100)
HCT VFR BLD AUTO: 36.4 % (ref 35–47)
HGB BLD-MCNC: 12.1 G/DL (ref 11.5–16)
IMM GRANULOCYTES # BLD: 0 K/UL (ref 0–0.04)
IMM GRANULOCYTES NFR BLD AUTO: 0 % (ref 0–0.5)
LYMPHOCYTES # BLD: 1.4 K/UL (ref 0.8–3.5)
LYMPHOCYTES NFR BLD: 11 % (ref 12–49)
MCH RBC QN AUTO: 29 PG (ref 26–34)
MCHC RBC AUTO-ENTMCNC: 33.2 G/DL (ref 30–36.5)
MCV RBC AUTO: 87.3 FL (ref 80–99)
MONOCYTES # BLD: 0.3 K/UL (ref 0–1)
MONOCYTES NFR BLD: 2 % (ref 5–13)
NEUTS SEG # BLD: 11 K/UL (ref 1.8–8)
NEUTS SEG NFR BLD: 86 % (ref 32–75)
NRBC # BLD: 0 K/UL (ref 0–0.01)
NRBC BLD-RTO: 0 PER 100 WBC
PLATELET # BLD AUTO: 250 K/UL (ref 150–400)
PMV BLD AUTO: 12.7 FL (ref 8.9–12.9)
POTASSIUM SERPL-SCNC: 3.4 MMOL/L (ref 3.5–5.1)
RBC # BLD AUTO: 4.17 M/UL (ref 3.8–5.2)
SODIUM SERPL-SCNC: 141 MMOL/L (ref 136–145)
TSH SERPL DL<=0.05 MIU/L-ACNC: 1.17 UIU/ML (ref 0.36–3.74)
WBC # BLD AUTO: 12.7 K/UL (ref 3.6–11)

## 2018-05-13 PROCEDURE — 80048 BASIC METABOLIC PNL TOTAL CA: CPT | Performed by: INTERNAL MEDICINE

## 2018-05-13 PROCEDURE — 96361 HYDRATE IV INFUSION ADD-ON: CPT

## 2018-05-13 PROCEDURE — 96376 TX/PRO/DX INJ SAME DRUG ADON: CPT

## 2018-05-13 PROCEDURE — 96372 THER/PROPH/DIAG INJ SC/IM: CPT

## 2018-05-13 PROCEDURE — 74011250636 HC RX REV CODE- 250/636: Performed by: HOSPITALIST

## 2018-05-13 PROCEDURE — 99218 HC RM OBSERVATION: CPT

## 2018-05-13 PROCEDURE — 74011250637 HC RX REV CODE- 250/637: Performed by: HOSPITALIST

## 2018-05-13 PROCEDURE — 97530 THERAPEUTIC ACTIVITIES: CPT

## 2018-05-13 PROCEDURE — 74011250636 HC RX REV CODE- 250/636: Performed by: INTERNAL MEDICINE

## 2018-05-13 PROCEDURE — 85025 COMPLETE CBC W/AUTO DIFF WBC: CPT | Performed by: INTERNAL MEDICINE

## 2018-05-13 PROCEDURE — 84443 ASSAY THYROID STIM HORMONE: CPT | Performed by: HOSPITALIST

## 2018-05-13 PROCEDURE — 36415 COLL VENOUS BLD VENIPUNCTURE: CPT | Performed by: INTERNAL MEDICINE

## 2018-05-13 PROCEDURE — 74011250637 HC RX REV CODE- 250/637: Performed by: INTERNAL MEDICINE

## 2018-05-13 PROCEDURE — 97161 PT EVAL LOW COMPLEX 20 MIN: CPT

## 2018-05-13 RX ORDER — POTASSIUM CHLORIDE 750 MG/1
40 TABLET, FILM COATED, EXTENDED RELEASE ORAL DAILY
Status: DISCONTINUED | OUTPATIENT
Start: 2018-05-13 | End: 2018-05-14 | Stop reason: HOSPADM

## 2018-05-13 RX ORDER — SODIUM CHLORIDE, SODIUM LACTATE, POTASSIUM CHLORIDE, CALCIUM CHLORIDE 600; 310; 30; 20 MG/100ML; MG/100ML; MG/100ML; MG/100ML
100 INJECTION, SOLUTION INTRAVENOUS CONTINUOUS
Status: DISCONTINUED | OUTPATIENT
Start: 2018-05-13 | End: 2018-05-14 | Stop reason: HOSPADM

## 2018-05-13 RX ORDER — ONDANSETRON 4 MG/1
4 TABLET, ORALLY DISINTEGRATING ORAL
Status: DISCONTINUED | OUTPATIENT
Start: 2018-05-13 | End: 2018-05-14 | Stop reason: HOSPADM

## 2018-05-13 RX ADMIN — Medication 10 ML: at 05:08

## 2018-05-13 RX ADMIN — MECLIZINE 25 MG: 12.5 TABLET ORAL at 05:09

## 2018-05-13 RX ADMIN — Medication 10 ML: at 23:26

## 2018-05-13 RX ADMIN — MECLIZINE 25 MG: 12.5 TABLET ORAL at 18:08

## 2018-05-13 RX ADMIN — Medication 10 ML: at 13:01

## 2018-05-13 RX ADMIN — MECLIZINE 25 MG: 12.5 TABLET ORAL at 11:54

## 2018-05-13 RX ADMIN — SODIUM CHLORIDE, SODIUM LACTATE, POTASSIUM CHLORIDE, AND CALCIUM CHLORIDE 100 ML/HR: 600; 310; 30; 20 INJECTION, SOLUTION INTRAVENOUS at 09:01

## 2018-05-13 RX ADMIN — ONDANSETRON 4 MG: 2 INJECTION INTRAMUSCULAR; INTRAVENOUS at 05:07

## 2018-05-13 RX ADMIN — THERA TABS 1 TABLET: TAB at 08:53

## 2018-05-13 RX ADMIN — CYANOCOBALAMIN TAB 500 MCG 500 MCG: 500 TAB at 08:53

## 2018-05-13 RX ADMIN — CETIRIZINE HYDROCHLORIDE 10 MG: 10 TABLET, FILM COATED ORAL at 08:53

## 2018-05-13 RX ADMIN — POTASSIUM CHLORIDE 40 MEQ: 750 TABLET, FILM COATED, EXTENDED RELEASE ORAL at 08:53

## 2018-05-13 RX ADMIN — ONDANSETRON 4 MG: 4 TABLET, ORALLY DISINTEGRATING ORAL at 23:25

## 2018-05-13 RX ADMIN — ENOXAPARIN SODIUM 40 MG: 40 INJECTION SUBCUTANEOUS at 18:09

## 2018-05-13 RX ADMIN — MECLIZINE 25 MG: 12.5 TABLET ORAL at 23:25

## 2018-05-13 RX ADMIN — ONDANSETRON 4 MG: 4 TABLET, ORALLY DISINTEGRATING ORAL at 13:00

## 2018-05-13 NOTE — PROGRESS NOTES
Problem: Falls - Risk of  Goal: *Absence of Falls  Document Alonzo Fall Risk and appropriate interventions in the flowsheet.    Outcome: Progressing Towards Goal  Fall Risk Interventions:  Mobility Interventions: Patient to call before getting OOB         Medication Interventions: Patient to call before getting OOB    Elimination Interventions: Patient to call for help with toileting needs

## 2018-05-13 NOTE — PROGRESS NOTES
Problem: General Medical Care Plan  Goal: *Vital signs within specified parameters  Outcome: Progressing Towards Goal  VS is within normal range

## 2018-05-13 NOTE — H&P
[address]  HISTORY AND PHYSICAL      Jaclyn Mei  MR#: [id number]  : 1970  ACCOUNT #: [de-identified]   ADMIT DATE: 2018    CHIEF COMPLAINT:  Acute onset of vertigo with nystagmus. It happened earlier today. HISTORY OF PRESENT ILLNESS:  The patient is a 72-year-old female with a past medical history of narcolepsy and asthma. As per the patient, she was doing fine until earlier this evening. Had acute onset of severe vertigo with nystagmus and the symptoms were so severe that the patient was unable to open her eyes and was unable to keep her eyes open and was almost falling, then was brought to the ER, had a CT scan done, which was negative. The patient also had an MRI scan of the brain, which was also within normal limits. Currently, the patient continues to have some vertigo; however, feels better from prior to coming to the ER. PAST MEDICAL HISTORY:  History of asthma,  history of narcolepsy, history of migraine. SOCIAL HISTORY:  Former smoker, 12-pack years of smoking. Alcohol use. SURGICAL HISTORY:  Has a history of prior multiple orthopedic surgeries. FAMILY HISTORY:  Mother had hypertension. Father has no medical issues. ALLERGIES:  LATEX AND SULFA. ALSO IS ALLERGIC TO CODEINE AND ZANTAC. MEDICATIONS:  The patient is on following medications:  Zyrtec 10 mg p.o. every day, is on Adderall 10 mg p.o. every day for hypersomnolence, on vitamin B12 subQ injections. REVIEW OF SYSTEMS    CONSTITUTIONAL:  Negative. HEENT:  Negative. PULMONARY:  Negative. GI:  Negative. CNS:  Positive vertigo. Positive nystagmus. GENITOURINARY:  Negative. PHYSICAL EXAMINATION:  VITAL SIGNS:  As follows:  Blood pressure 110/69, pulse rate of 60, temperature of 97.6, saturations of 100%, respirations 16. HEAD, EYES, EARS, NOSE, THROAT:  Pupils are equal, react to light. Oral mucosa was moist.  NECK:  Supple. LUNGS:  Clear.   CARDIAC:  S1, S2 audible. No S3, S4.  ABDOMEN:  Soft, nontender, no guarding, no rigidity, no rebound. EXTREMITIES:  There is no edema. DATA:  The patient's CT was negative. MRI scan was negative. ASSESSMENT AND PLAN:  The patient is a 44-year-old female who presents with vertigo and nystagmus. 1.  Most likely benign positional vertigo. Patient has been started on Antivert. We will hydrate and observe the patient for now. Push fluids. will observe for now. We will also get Neurology consult. 2.  Hypersomnolence. Narcolepsy. Will continue with the Adderall. 3.  Code status:  FULL CODE. 4.  Deep venous thrombosis prophylaxis. The patient was given Lovenox.       MD ELMER Carrion/NANO  D: 05/12/2018 20:59     T: 05/12/2018 21:27  JOB #: [id number]

## 2018-05-13 NOTE — PROGRESS NOTES
TRANSFER - IN REPORT:    Verbal report received from  Nicolas Dickey RN(name) on Courtney Tomasz  being received from ER(unit) for routine progression of care      Report consisted of patients Situation, Background, Assessment and   Recommendations(SBAR). Information from the following report(s) ED Summary and MAR was reviewed with the receiving nurse. Opportunity for questions and clarification was provided. Assessment completed upon patients arrival to unit and care assumed.

## 2018-05-13 NOTE — ROUTINE PROCESS
TRANSFER - OUT REPORT:    Verbal report given to Fallon Arreola (name) on Aliya Morgan  being transferred to [address] [place] [address] (unit) for routine progression of care       Report consisted of patients Situation, Background, Assessment and   Recommendations(SBAR). Information from the following report(s) SBAR, Kardex, ED Summary and MAR was reviewed with the receiving nurse. Lines:   Peripheral IV 05/12/18 Left Antecubital (Active)   Site Assessment Clean, dry, & intact 5/12/2018  8:55 PM   Phlebitis Assessment 0 5/12/2018  8:55 PM   Infiltration Assessment 0 5/12/2018  8:55 PM   Dressing Status Clean, dry, & intact 5/12/2018  8:55 PM   Dressing Type Transparent 5/12/2018  8:55 PM        Opportunity for questions and clarification was provided.       Patient transported with:   Monitor  O2 @ 2 liters

## 2018-05-13 NOTE — CONSULTS
[address]    Nadia Alonzo  MR#: [id number]  : 1970  ACCOUNT #: [de-identified]   DATE OF SERVICE: 2018    REQUESTING PHYSICIAN:  Dr. Willow Pierret. REASON FOR EVALUATION:  Vertigo. HISTORY OF PRESENT ILLNESS:  The patient is a 53-year-old female with a history of narcolepsy and asthma. She was doing fine until yesterday afternoon when she had a sudden onset of dizziness and vertigo. She could not focus on anything and she could feel her eyes moving from side to side. She was nauseous and vomited several times. She also was off balance and was brought into the Emergency Department. She was admitted for further workup. She had an MRI of the brain which was negative. Overall, her symptoms are better, but she still feels as if things start to spin around if she looks over to her right side. No changes in vision, speech; no focal motor or sensory deficits, chest pain, shortness of breath, palpitations. PAST MEDICAL HISTORY:  As mentioned above. SOCIAL HISTORY:  Former smoker, no alcohol use. ALLERGIES:  LATEX, SULFA, CODEINE AND ZANTAC. HOME MEDICATIONS:  Zyrtec, Adderall, vitamin B12 injections. REVIEW OF SYSTEMS:  As mentioned above. PHYSICAL EXAMINATION:  GENERAL:  Patient is alert, fully oriented. VITAL SIGNS:  Blood pressure 101/63, temperature 98, pulse is 63. NEUROLOGIC:  Speech is clear. Comprehension is normal.  Pupils equal, round, reactive. Extraocular movements are full. There is horizontal nystagmus that worsens with the right lateral gaze. Visual acuity is intact. Face is symmetric. Tongue is midline. Hearing is baseline. Muscle tone and bulk normal.  Strength normal in both upper and lower extremities. No dysmetria on finger-nose-finger testing. Deep tendon reflexes 2/2 and symmetric. Toes downgoing. Sensation is intact. Gait exam is deferred. Julianna-Hallpike testing is positive.   HEART:  Regular rate and rhythm. CHEST:  Clear. ABDOMEN:  Soft, nontender. Positive bowel sounds. EXTREMITIES:  No edema. LABORATORY DATA:  CBC with WBC 12.7, hemoglobin 12.1, hematocrit 36.4, platelets 134. Chemistry:  Sodium 141, potassium 3.4, BUN 9, creatinine 0.58. CT scan of the brain did not show any acute process. MRI scan of the brain was unremarkable. ASSESSMENT AND PLAN:  A 72-year-old female admitted with acute onset of dizziness and vertigo. Based on history and clinical examination, she likely has a benign paroxysmal positional vertigo with right-sided vestibular dysfunction. I performed modified Epley exercises at bedside and she seemed to tolerate it well. She should continue these exercises and I will request physical therapy to help her. Continue meclizine every 8 hours for 1 or 2 days. Once the symptoms settle and she is able to ambulate, we can initiate discharge planning. Please feel free to contact me with any further questions. Thank you for this consultation.       Burnie Nyhan, MD ASS / CAYDEN  D: 05/13/2018 14:15     T: 05/13/2018 15:14  JOB #: [id number]

## 2018-05-13 NOTE — PROGRESS NOTES
Problem: Mobility Impaired (Adult and Pediatric)  Goal: *Acute Goals and Plan of Care (Insert Text)  Physical Therapy Goals  Initiated 5/13/2018  1. Patient will ambulate with modified independence for 200 feet with the least [id number] device within 7 day(s). 2.  Patient will ascend/descend 3 stairs with 1 handrail(s) with modified independence within 7 day(s). physical Therapy EVALUATION  Patient: Ray Cleveland (22 y.o. female)  Date: 5/13/2018  Primary Diagnosis: Nystagmus  Vertigo        Precautions:   Fall    ASSESSMENT :  Based on the objective data described below, the patient presents with dizziness, vertigo, and nystagmus with difficulty walking d/t the severity of symptoms. Prior to admission this patient was independent and working full time. Left posterior canal appears affected with sx reproduced in left sidelying. Completed modified Epley maneuver x2 with mild improvement in sx. Patient reports significant improvement since admission but far from her baseline. Mobility concerns related to dizziness and vertigo and anticipate she will be at baseline when sx resolve. No discharge therapy needs identified but she may benefit from returning to her ENT if sx are persistent. Patient will benefit from skilled intervention to address the above impairments.   Patients rehabilitation potential is considered to be Excellent  Factors which may influence rehabilitation potential include:   [x]         None noted  []         Mental ability/status  []         Medical condition  []         Home/family situation and support systems  []         Safety awareness  []         Pain tolerance/management  []         Other:      PLAN :  Recommendations and Planned Interventions:  [x]           Bed Mobility Training             [x]    Neuromuscular Re-Education  [x]           Transfer Training                   []    Orthotic/Prosthetic Training  [x]           Gait Training                         [] Modalities  []           Therapeutic Exercises           []    Edema Management/Control  [x]           Therapeutic Activities            []    Patient and Family Training/Education  []           Other (comment):    Frequency/Duration: Patient will be followed by physical therapy  3 times a week to address goals. Discharge Recommendations: None  Further Equipment Recommendations for Discharge: None     SUBJECTIVE:   Patient stated I feel a little better than I did yesterday.     OBJECTIVE DATA SUMMARY:   HISTORY:    Past Medical History:   Diagnosis Date    Asthma     Hypersomnia     TAKES ADDERAL FOR BOUTS OF SUDDEN EXTREME EXHAUSTION    Migraine     Unspecified adverse effect of anesthesia     HYPERSOMNIA    UTI (urinary tract infection) 9/23/13    MULTIPLE IN PAST     Past Surgical History:   Procedure Laterality Date    HX ENDOSCOPY  2008    HX GYN  2013    hysteroscopy    HX HEENT  2011 AUG. SEPTOPLASTY, SINUS CAUTERIZATION    HX ORTHOPAEDIC  12/2012    ORIF L THUMB/BONE GRAFT    HX WISDOM TEETH EXTRACTION  AGE 20     Prior Level of Function/Home Situation: independent and active  Personal factors and/or comorbidities impacting plan of care: none    Home Situation  Home Environment: Private residence  # Steps to Enter: 4  One/Two Story Residence: Two story  # of Interior Steps: 14  Height of Each Step (in): 10 inches  Interior Rails: Right  Lift Chair Available: No  Living Alone: Yes  Support Systems: Family member(s)  Patient Expects to be Discharged to[de-identified] Private residence  Current DME Used/Available at Home: None    EXAMINATION/PRESENTATION/DECISION MAKING:   Critical Behavior:  Neurologic State: Alert, Appropriate for age, Other (Comment) ([de-identified] when standing)  Orientation Level: Appropriate for age, Oriented X4  Cognition: Appropriate decision making, Appropriate for age attention/concentration, Appropriate safety awareness, Follows commands     Hearing:   Auditory  Auditory Impairment: None  Functional Mobility:  Bed Mobility:     Supine to Sit: Independent  Sit to Supine: Independent     Transfers:  Sit to Stand: Independent  Stand to Sit: Independent                       Balance:   Sitting: Intact  Standing: Impaired  Standing - Static: Fair  Standing - Dynamic : Fair  Ambulation/Gait Training:  Distance (ft): 20 Feet (ft)  Assistive Device:  (hand hold assist)  Ambulation - Level of Assistance: Minimal assistance     Gait Description (WDL): Exceptions to WDL  Gait Abnormalities: Decreased step clearance;Trunk sway increased        Base of Support: Narrowed     Speed/Mya: Slow                     Increased left path deviation and LOB      Physical Therapy Evaluation Charge Determination   History Examination Presentation Decision-Making   MEDIUM  Complexity : 1-2 comorbidities / personal factors will impact the outcome/ POC  LOW Complexity : 1-2 Standardized tests and measures addressing body structure, function, activity limitation and / or participation in recreation  LOW Complexity : Stable, uncomplicated  LOW Complexity : FOTO score of       Based on the above components, the patient evaluation is determined to be of the following complexity level: LOW     Pain:  Pain Scale 1: Numeric (0 - 10)  Pain Intensity 1: 0              Activity Tolerance:     After treatment:   []         Patient left in no apparent distress sitting up in chair  [x]         Patient left in no apparent distress in bed  [x]         Call bell left within reach  [x]         Nursing notified  []         Caregiver present  []         Bed alarm activated    COMMUNICATION/EDUCATION:   The patients plan of care was discussed with: Registered Nurse. [x]         Fall prevention education was provided and the patient/caregiver indicated understanding. [x]         Patient/family have participated as able in goal setting and plan of care.   [x]         Patient/family agree to work toward stated goals and plan of care.  []         Patient understands intent and goals of therapy, but is neutral about his/her participation. []         Patient is unable to participate in goal setting and plan of care.     Thank you for this referral.  Obie Morales, PT, DPT   Time Calculation: 20 mins

## 2018-05-13 NOTE — PROGRESS NOTES
Bedside and Verbal shift change report given to [address] [place] [address] Cole (oncoming nurse) by Vijaya Aguilar (offgoing nurse). Report included the following information SBAR.

## 2018-05-14 VITALS
SYSTOLIC BLOOD PRESSURE: 121 MMHG | WEIGHT: 187 LBS | OXYGEN SATURATION: 97 % | RESPIRATION RATE: 14 BRPM | DIASTOLIC BLOOD PRESSURE: 78 MMHG | TEMPERATURE: 97.7 F | BODY MASS INDEX: 30.05 KG/M2 | HEART RATE: 52 BPM | HEIGHT: 66 IN

## 2018-05-14 LAB
ANION GAP SERPL CALC-SCNC: 6 MMOL/L (ref 5–15)
ATRIAL RATE: 44 BPM
BASOPHILS # BLD: 0.1 K/UL (ref 0–0.1)
BASOPHILS NFR BLD: 1 % (ref 0–1)
BUN SERPL-MCNC: 12 MG/DL (ref 6–20)
BUN/CREAT SERPL: 16 (ref 12–20)
CALCIUM SERPL-MCNC: 8.1 MG/DL (ref 8.5–10.1)
CALCULATED P AXIS, ECG09: 33 DEGREES
CALCULATED R AXIS, ECG10: 13 DEGREES
CALCULATED T AXIS, ECG11: 18 DEGREES
CHLORIDE SERPL-SCNC: 113 MMOL/L (ref 97–108)
CO2 SERPL-SCNC: 24 MMOL/L (ref 21–32)
CREAT SERPL-MCNC: 0.74 MG/DL (ref 0.55–1.02)
DIAGNOSIS, 93000: NORMAL
DIFFERENTIAL METHOD BLD: ABNORMAL
EOSINOPHIL # BLD: 0.2 K/UL (ref 0–0.4)
EOSINOPHIL NFR BLD: 2 % (ref 0–7)
ERYTHROCYTE [DISTWIDTH] IN BLOOD BY AUTOMATED COUNT: 13.9 % (ref 11.5–14.5)
GLUCOSE SERPL-MCNC: 97 MG/DL (ref 65–100)
HCT VFR BLD AUTO: 34.8 % (ref 35–47)
HGB BLD-MCNC: 11.4 G/DL (ref 11.5–16)
IMM GRANULOCYTES # BLD: 0 K/UL (ref 0–0.04)
IMM GRANULOCYTES NFR BLD AUTO: 0 % (ref 0–0.5)
LYMPHOCYTES # BLD: 3.5 K/UL (ref 0.8–3.5)
LYMPHOCYTES NFR BLD: 42 % (ref 12–49)
MCH RBC QN AUTO: 29.2 PG (ref 26–34)
MCHC RBC AUTO-ENTMCNC: 32.8 G/DL (ref 30–36.5)
MCV RBC AUTO: 89 FL (ref 80–99)
MONOCYTES # BLD: 0.4 K/UL (ref 0–1)
MONOCYTES NFR BLD: 5 % (ref 5–13)
NEUTS SEG # BLD: 4.1 K/UL (ref 1.8–8)
NEUTS SEG NFR BLD: 49 % (ref 32–75)
NRBC # BLD: 0 K/UL (ref 0–0.01)
NRBC BLD-RTO: 0 PER 100 WBC
P-R INTERVAL, ECG05: 132 MS
PLATELET # BLD AUTO: 218 K/UL (ref 150–400)
PMV BLD AUTO: 12.5 FL (ref 8.9–12.9)
POTASSIUM SERPL-SCNC: 3.7 MMOL/L (ref 3.5–5.1)
Q-T INTERVAL, ECG07: 526 MS
QRS DURATION, ECG06: 88 MS
QTC CALCULATION (BEZET), ECG08: 449 MS
RBC # BLD AUTO: 3.91 M/UL (ref 3.8–5.2)
SODIUM SERPL-SCNC: 143 MMOL/L (ref 136–145)
VENTRICULAR RATE, ECG03: 44 BPM
WBC # BLD AUTO: 8.3 K/UL (ref 3.6–11)

## 2018-05-14 PROCEDURE — 74011250636 HC RX REV CODE- 250/636: Performed by: HOSPITALIST

## 2018-05-14 PROCEDURE — 74011250637 HC RX REV CODE- 250/637: Performed by: HOSPITALIST

## 2018-05-14 PROCEDURE — 74011250636 HC RX REV CODE- 250/636: Performed by: INTERNAL MEDICINE

## 2018-05-14 PROCEDURE — 36415 COLL VENOUS BLD VENIPUNCTURE: CPT | Performed by: HOSPITALIST

## 2018-05-14 PROCEDURE — 99218 HC RM OBSERVATION: CPT

## 2018-05-14 PROCEDURE — 74011250637 HC RX REV CODE- 250/637: Performed by: INTERNAL MEDICINE

## 2018-05-14 PROCEDURE — 85025 COMPLETE CBC W/AUTO DIFF WBC: CPT | Performed by: HOSPITALIST

## 2018-05-14 PROCEDURE — 80048 BASIC METABOLIC PNL TOTAL CA: CPT | Performed by: HOSPITALIST

## 2018-05-14 RX ORDER — MECLIZINE HYDROCHLORIDE 25 MG/1
25 TABLET ORAL
Qty: 20 TAB | Refills: 0 | Status: SHIPPED | OUTPATIENT
Start: 2018-05-14 | End: 2018-05-24

## 2018-05-14 RX ORDER — ACETAMINOPHEN 325 MG/1
650 TABLET ORAL ONCE
Status: COMPLETED | OUTPATIENT
Start: 2018-05-14 | End: 2018-05-14

## 2018-05-14 RX ADMIN — CYANOCOBALAMIN TAB 500 MCG 500 MCG: 500 TAB at 09:11

## 2018-05-14 RX ADMIN — Medication 10 ML: at 05:50

## 2018-05-14 RX ADMIN — ONDANSETRON 4 MG: 4 TABLET, ORALLY DISINTEGRATING ORAL at 05:49

## 2018-05-14 RX ADMIN — CETIRIZINE HYDROCHLORIDE 10 MG: 10 TABLET, FILM COATED ORAL at 09:11

## 2018-05-14 RX ADMIN — DEXTROAMPHETAMINE SACCHARATE, AMPHETAMINE ASPARTATE, DEXTROAMPHETAMINE SULFATE AND AMPHETAMINE SULFATE 20 MG: 2.5; 2.5; 2.5; 2.5 TABLET ORAL at 09:11

## 2018-05-14 RX ADMIN — THERA TABS 1 TABLET: TAB at 09:11

## 2018-05-14 RX ADMIN — MECLIZINE 25 MG: 12.5 TABLET ORAL at 11:50

## 2018-05-14 RX ADMIN — POTASSIUM CHLORIDE 40 MEQ: 750 TABLET, FILM COATED, EXTENDED RELEASE ORAL at 09:11

## 2018-05-14 RX ADMIN — SODIUM CHLORIDE, SODIUM LACTATE, POTASSIUM CHLORIDE, AND CALCIUM CHLORIDE 100 ML/HR: 600; 310; 30; 20 INJECTION, SOLUTION INTRAVENOUS at 00:35

## 2018-05-14 RX ADMIN — MECLIZINE 25 MG: 12.5 TABLET ORAL at 05:49

## 2018-05-14 RX ADMIN — ACETAMINOPHEN 650 MG: 325 TABLET ORAL at 03:19

## 2018-05-14 NOTE — DISCHARGE INSTRUCTIONS
Benign Paroxysmal Positional Vertigo (BPPV): Care Instructions  Your Care Instructions    Benign paroxysmal positional vertigo, also called BPPV, is an inner ear problem. It causes a spinning or whirling sensation when you move your head. This sensation is called vertigo. The vertigo usually lasts for less than a minute. People often have vertigo spells for a few days or weeks. Then the vertigo goes away. But it may come back again. The vertigo may be mild, or it may be bad enough to cause unsteadiness, nausea, and vomiting. When you move, your inner ear sends messages to the brain. This helps you keep your balance. Vertigo can happen when debris builds up in the inner ear. The buildup can cause the inner ear to send the wrong message to the brain. Your doctor may move you in different positions to help your vertigo get better faster. This is called the Epley maneuver. Your doctor may also prescribe medicines or exercises to help with your symptoms. Follow-up care is a key part of your treatment and safety. Be sure to make and go to all appointments, and call your doctor if you are having problems. It's also a good idea to know your test results and keep a list of the medicines you take. How can you care for yourself at home? · If your doctor suggests that you do Stover-Daroff exercises:  ¨ Sit on the edge of a bed or sofa. Quickly lie down on the side that causes the worst vertigo. Lie on your side with your ear down. ¨ Stay in this position for at least 30 seconds or until the vertigo goes away. ¨ Sit up. If this causes vertigo, wait for it to stop. ¨ Repeat the procedure on the other side. ¨ Repeat this 10 times. Do these exercises 2 times a day until the vertigo is gone. When should you call for help? Call 911 anytime you think you may need emergency care. For example, call if:  ? · You have symptoms of a stroke.  These may include:  ¨ Sudden numbness, tingling, weakness, or loss of movement in your face, arm, or leg, especially on only one side of your body. ¨ Sudden vision changes. ¨ Sudden trouble speaking. ¨ Sudden confusion or trouble understanding simple statements. ¨ Sudden problems with walking or balance. ¨ A sudden, severe headache that is different from past headaches. ?Call your doctor now or seek immediate medical care if:  ? · You have new or worse nausea and vomiting. ? · You have new symptoms such as hearing loss or roaring in your ears. ? Watch closely for changes in your health, and be sure to contact your doctor if:  ? · You are not getting better as expected. ? · Your vertigo gets worse. Where can you learn more? Go to http://urvashiTwitmusickristi.info/. Enter  in the search box to learn more about \"Benign Paroxysmal Positional Vertigo (BPPV): Care Instructions. \"  Current as of: May 12, 2017  Content Version: 11.4  © 5992-9711 [employer]. Care instructions adapted under license by [employer] (which disclaims liability or warranty for this information). If you have questions about a medical condition or this instruction, always ask your healthcare professional. [place] [address] any warranty or liability for your use of this information.        Discharge Instructions       PATIENT ID: Marcel Swann  MRN: [id number]   YOB: 1970    DATE OF ADMISSION: 5/12/2018  3:39 PM    DATE OF DISCHARGE: 5/14/2018    PRIMARY CARE PROVIDER: Balbir Penaloza MD     ATTENDING PHYSICIAN: Samantha Agrawal MD  DISCHARGING PROVIDER: Samantha Agrawal MD    To contact this individual call [phone] and ask the  to page. If unavailable ask to be transferred the Adult Hospitalist Department. DISCHARGE DIAGNOSES and ADDITIONAL CARE RECOMMENDATIONS:  Vertigo(Benign Paroxysmal Positional Vertigo (BPPV)) with right nystagmus. Head cat scan and brain MRI are both negative for any acute intracranial processes. You may use the meclizine as needed. You check with your ENT for possible office based treatment . You can also do some home based maneuvers yourself,read the directions you are provided with this discharge paper. CONSULTATIONS: IP CONSULT TO NEUROLOGY    PROCEDURES/SURGERIES: * No surgery found *    PENDING TEST RESULTS   At the time of discharge the following test results are still pending: none    FOLLOW UP APPOINTMENTS:   Follow-up Information     Follow up With Details Comments Contact Info       See your ENT doctor as sson as you can after discharge               DIET: Regular Diet    ACTIVITY: Activity as tolerated        DISCHARGE MEDICATIONS:   See Medication Reconciliation Form    · It is important that you take the medication exactly as they are prescribed. · Keep your medication in the bottles provided by the pharmacist and keep a list of the medication names, dosages, and times to be taken in your wallet. · Do not take other medications without consulting your doctor. NOTIFY YOUR PHYSICIAN FOR ANY OF THE FOLLOWING:   Fever over 101 degrees for 24 hours. Chest pain, shortness of breath, fever, chills, nausea, vomiting, diarrhea, change in mentation, falling, weakness, bleeding. Severe pain or pain not relieved by medications. Or, any other signs or symptoms that you may have questions about. DISPOSITION:  x  Home With:   OT  PT  HH  RN       SNF/Inpatient Rehab/LTAC    Independent/assisted living    Hospice    Other:       Signed:    Rafael Franco MD  5/14/2018  8:15 AM

## 2018-05-14 NOTE — PROGRESS NOTES
Hospitalist Progress Note                               [address] [place]                                     Answering service: [phone]                               OR 6214 from in house phone                               Cell: [phone]              Date of Service:  2018  NAME:  Aliya Morgan YOB: 1970  MRN:  [id number]      Admission Summary: This is a 41year old female with pmh of asthma,narcolepsy and vertigo came to the ER with intense vertigo,the worst she has ever experienced. F/U for vertigo   Late entry note. I saw patient early in the afternoon. The vertigo is better today. She has not moved beyond the bedside commode. I have asked PT to see her. Assessment & Plan:   BPPV with right nystagmus  -Head CT and brain MRI negative  -Continue meclizine  -PT for vestibular exercises,assessment of safety with gait and balance  -Neurologist to see. I have advised pt to see ENT as out patient shortly after discharge. Mild hypokalemia: klcor con po    H/o asthma: not bronchospastic presently  Narcolepsy: on adderall    Physician advisor recommended observation status. Possible d/c in AM        Hospital Problems  Date Reviewed: 2018          Codes Class Noted POA    * (Principal)Vertigo ICD-10-CM: R42  ICD-9-CM: 780.4  2018 Unknown        Nystagmus ICD-10-CM: H55.00  ICD-9-CM: 379.50  2018 Unknown                Review of Systems:   A comprehensive review of systems was negative except for that written in the HPI. Physical Examination:      Last 24hrs VS reviewed since prior progress note. Most recent are:  Visit Vitals    BP 96/58    Pulse 76    Temp 98.5 °F (36.9 °C)    Resp 16    Ht 5' 6\" (1.676 m)    Wt 84.8 kg (187 lb)    SpO2 93%    Breastfeeding No    BMI 30.18 kg/m2           Constitutional:  No acute distress, cooperative, pleasant    HEENT: Head is a traumatic,  Un icteric sclera. Pink conjunctiva,no erythema or discharge. Oral mucous moist, oropharynx benign. Neck supple,    Resp:  CTA bilaterally. No wheezing/rhonchi/rales. No accessory muscle use   CV:  Regular rhythm, normal rate, no murmurs, gallops, rubs    GI:  Soft, non distended, non tender. normoactive bowel sounds, no hepatosplenomegaly    :  No CVA or suprapubic tenderness   Skin  :  No erythema,rash,bullae,dipigmentation     Musculoskeletal:  No edema, warm, 2+ pulses throughout    Neurologic:  AAOx3, right horizontal nystagmus. Moves all extremities. Psych:  Good insight, Not anxious nor agitated. No intake or output data in the 24 hours ending 05/13/18 2112       Data Review:    Review and/or order of clinical lab test  Review and/or order of tests in the radiology section of CPT  Review and/or order of tests in the medicine section of CPT      Labs:     Recent Labs      05/13/18   0240  05/12/18   1547   WBC  12.7*  11.8*   HGB  12.1  12.7   HCT  36.4  37.8   PLT  250  241     Recent Labs      05/13/18   0240  05/12/18   1547   NA  141  142   K  3.4*  3.2*   CL  112*  110*   CO2  20*  18*   BUN  9  16   CREA  0.58  1.01   GLU  96  136*   CA  7.9*  8.5     Recent Labs      05/12/18   1547   SGOT  21   ALT  27   AP  77   TBILI  0.5   TP  6.6   ALB  3.7   GLOB  2.9   LPSE  102     No results for input(s): INR, PTP, APTT in the last 72 hours. No lab exists for component: INREXT   No results for input(s): FE, TIBC, PSAT, FERR in the last 72 hours. No results found for: FOL, RBCF   No results for input(s): PH, PCO2, PO2 in the last 72 hours. No results for input(s): CPK, CKNDX, TROIQ in the last 72 hours.     No lab exists for component: CPKMB  No results found for: CHOL, CHOLX, CHLST, CHOLV, HDL, LDL, LDLC, DLDLP, TGLX, TRIGL, TRIGP, CHHD, CHHDX  No results found for: GLUCPOC  No results found for: COLOR, APPRN, SPGRU, REFSG, DAKSHA, PROTU, GLUCU, KETU, BILU, UROU, MINDA, LEUKU, GLUKE, EPSU, BACTU, WBCU, RBCU, CASTS, [facility]      Medications Reviewed:     Current Facility-Administered Medications   Medication Dose Route Frequency    lactated Ringers infusion  100 mL/hr IntraVENous CONTINUOUS    potassium chloride SR (KLOR-CON 10) tablet 40 mEq  40 mEq Oral DAILY    ondansetron (ZOFRAN ODT) tablet 4 mg  4 mg Oral Q8H PRN    meclizine (ANTIVERT) tablet 25 mg  25 mg Oral Q6H    sodium chloride (NS) flush 5-10 mL  5-10 mL IntraVENous Q8H    sodium chloride (NS) flush 5-10 mL  5-10 mL IntraVENous PRN    enoxaparin (LOVENOX) injection 40 mg  40 mg SubCUTAneous Q24H    ondansetron (ZOFRAN) injection 4 mg  4 mg IntraVENous Q4H PRN    cetirizine (ZYRTEC) tablet 10 mg  10 mg Oral DAILY    dextroamphetamine-amphetamine (ADDERALL) tablet 20 mg  20 mg Oral DAILY PRN    therapeutic multivitamin (THERAGRAN) tablet 1 Tab  1 Tab Oral DAILY    cyanocobalamin (VITAMIN B12) tablet 500 mcg  500 mcg Oral DAILY     ______________________________________________________________________  EXPECTED LENGTH OF STAY: - - -  ACTUAL LENGTH OF STAY:          1                 [address] MD

## 2018-05-14 NOTE — DISCHARGE SUMMARY
Discharge Summary       PATIENT ID: Courtney Tolbert  MRN: [id number]   YOB: 1970    DATE OF ADMISSION: 5/12/2018  3:39 PM    DATE OF DISCHARGE: 5/14/2018  PRIMARY CARE PROVIDER: Mago Hunter MD     ATTENDING PHYSICIAN: Angeline Garza MD  DISCHARGING PROVIDER: Angeline Garza MD    To contact this individual call [phone] and ask the  to page. If unavailable ask to be transferred the Adult Hospitalist Department. CONSULTATIONS: IP CONSULT TO NEUROLOGY    PROCEDURES/SURGERIES: * No surgery found *    ADMITTING [address] [place] [address] COURSE:   Admission Summary: This is a 41year old female with pmh of asthma,narcolepsy and vertigo came to the ER with intense vertigo,the worst she has ever experienced. Assessment & Plan:   BPPV with right nystagmus  -Head CT and brain MRI negative  -Continue meclizine  -Patient seen by physical therapy. She is advised to follow with ENT and printed material on Modified Epley and Stover-Daroff maneuvers provided to her. She was also evaluated by neurologist.     Mild hypokalemia:corrected     H/o asthma: not bronchospastic presently  Narcolepsy: on adderall            PENDING TEST RESULTS:   At the time of discharge the following test results are still pending: none    FOLLOW UP APPOINTMENTS:    Follow-up Information     Follow up With Details Comments Contact Info       See your ENT doctor as sson as you can after discharge    Jay Wilson MD   [address] [place] [address] Rd  [phone]           DISCHARGE DIAGNOSES and ADDITIONAL CARE RECOMMENDATIONS:  Vertigo(Benign Paroxysmal Positional Vertigo (BPPV)) with right nystagmus. Head cat scan and brain MRI are both negative for any acute intracranial processes. You may use the meclizine as needed. You check with your ENT for possible office based treatment . You can also do some home based maneuvers yourself,read the directions you are provided with this discharge paper.        DIET: Regular Diet    ACTIVITY: Activity as tolerated          DISCHARGE MEDICATIONS:  Current Discharge Medication List      START taking these medications    Details   meclizine (ANTIVERT) 25 mg tablet Take 1 Tab by mouth three (3) times daily as needed for up to 10 days. Qty: 20 Tab, Refills: 0         CONTINUE these medications which have NOT CHANGED    Details   cetirizine (ZYRTEC) 10 mg tablet Take  by mouth daily as needed for Allergies. therapeutic multivitamin (THERAGRAN) tablet Take 1 Tab by mouth daily. ondansetron (ZOFRAN ODT) 4 mg disintegrating tablet Take 1 Tab by mouth every eight (8) hours as needed for Nausea. Qty: 20 Tab, Refills: 1      dextroamphetamine-amphetamine (ADDERALL) 10 mg tablet Take 20 mg by mouth daily as neededIndications: IDIOPATHIC HYPERSOMNOLENCE. CYANOCOBALAMIN, VITAMIN B-12, (VITAMIN B-12 PO) Take  by mouth daily. NOTIFY YOUR PHYSICIAN FOR ANY OF THE FOLLOWING:   Fever over 101 degrees for 24 hours. Chest pain, shortness of breath, fever, chills, nausea, vomiting, diarrhea, change in mentation, falling, weakness, bleeding. Severe pain or pain not relieved by medications. Or, any other signs or symptoms that you may have questions about.     DISPOSITION:   x Home With:   OT  PT  HH  RN       Long term SNF/Inpatient Rehab    Independent/assisted living    Hospice    Other:       PATIENT CONDITION AT DISCHARGE:     Functional status    Poor     Deconditioned    x Independent      Cognition    x Lucid     Forgetful     Dementia      Catheters/lines (plus indication)    Watkins     PICC     PEG     None      Code status   x  Full code     DNR      PHYSICAL EXAMINATION AT DISCHARGE:     Visit Vitals    /78 (BP 1 Location: Right arm, BP Patient Position: At rest)    Pulse (!) 52    Temp 97.7 °F (36.5 °C)    Resp 14    Ht 5' 6\" (1.676 m)    Wt 84.8 kg (187 lb)    SpO2 97%    Breastfeeding No    BMI 30.18 kg/m2    O2 Flow Rate (L/min): 2 l/min O2 Device: Room air    Temp (24hrs), Av.1 °F (36.7 °C), Min:97.7 °F (36.5 °C), Max:98.5 °F (36.9 °C)             Constitutional:  No acute distress, cooperative, pleasant    HEENT: Head is a traumatic,  Un icteric sclera. Pink conjunctiva,no erythema or discharge. Oral mucous moist, oropharynx benign. Neck supple,    Resp:  CTA bilaterally. No wheezing/rhonchi/rales. No accessory muscle use   CV:  Regular rhythm, normal rate, no murmurs, gallops, rubs    GI:  Soft, non distended, non tender. normoactive bowel sounds, no hepatosplenomegaly    :  No CVA or suprapubic tenderness   Skin  :  No erythema,rash,bullae,dipigmentation     Musculoskeletal:  No edema, warm, 2+ pulses throughout    Neurologic:  AAOx3, right horizontal nystagmus. Moves all extremities. Psych:  Good insight, Not anxious nor agitated. 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

## 2018-05-14 NOTE — PROGRESS NOTES
Hospital PCP [facility] follow-up appointment with Marley Sheldon on Wednesday May 16,2018 @ 11:00 a.m.  Added to AVS.   Edelmira Wilkerson CM Specialist